# Patient Record
Sex: MALE | Race: WHITE
[De-identification: names, ages, dates, MRNs, and addresses within clinical notes are randomized per-mention and may not be internally consistent; named-entity substitution may affect disease eponyms.]

---

## 2023-08-14 ENCOUNTER — HOSPITAL ENCOUNTER (EMERGENCY)
Dept: HOSPITAL 76 - ED | Age: 23
Discharge: HOME | End: 2023-08-14
Payer: COMMERCIAL

## 2023-08-14 VITALS — SYSTOLIC BLOOD PRESSURE: 102 MMHG | DIASTOLIC BLOOD PRESSURE: 65 MMHG

## 2023-08-14 VITALS — OXYGEN SATURATION: 99 %

## 2023-08-14 DIAGNOSIS — R59.0: ICD-10-CM

## 2023-08-14 DIAGNOSIS — R31.9: ICD-10-CM

## 2023-08-14 DIAGNOSIS — N20.1: Primary | ICD-10-CM

## 2023-08-14 LAB
ALBUMIN DIAFP-MCNC: 4.8 G/DL (ref 3.2–5.5)
ALBUMIN/GLOB SERPL: 1.8 {RATIO} (ref 1–2.2)
ALP SERPL-CCNC: 68 IU/L (ref 42–121)
ALT SERPL W P-5'-P-CCNC: 21 IU/L (ref 10–60)
ANION GAP SERPL CALCULATED.4IONS-SCNC: 9 MMOL/L (ref 6–13)
AST SERPL W P-5'-P-CCNC: 23 IU/L (ref 10–42)
BASOPHILS NFR BLD AUTO: 0.1 10^3/UL (ref 0–0.1)
BASOPHILS NFR BLD AUTO: 1.6 %
BILIRUB BLD-MCNC: 1 MG/DL (ref 0.2–1)
BUN SERPL-MCNC: 22 MG/DL (ref 6–20)
CALCIUM UR-MCNC: 9.9 MG/DL (ref 8.5–10.3)
CHLORIDE SERPL-SCNC: 107 MMOL/L (ref 101–111)
CLARITY UR REFRACT.AUTO: (no result)
CO2 SERPL-SCNC: 25 MMOL/L (ref 21–32)
CREAT SERPLBLD-SCNC: 0.9 MG/DL (ref 0.6–1.3)
EOSINOPHIL # BLD AUTO: 0.1 10^3/UL (ref 0–0.7)
EOSINOPHIL NFR BLD AUTO: 2.5 %
ERYTHROCYTE [DISTWIDTH] IN BLOOD BY AUTOMATED COUNT: 12.8 % (ref 12–15)
GFRSERPLBLD MDRD-ARVRAT: 104 ML/MIN/{1.73_M2} (ref 89–?)
GLOBULIN SER-MCNC: 2.6 G/DL (ref 2.1–4.2)
GLUCOSE SERPL-MCNC: 126 MG/DL (ref 74–104)
GLUCOSE UR QL STRIP.AUTO: NEGATIVE MG/DL
HCT VFR BLD AUTO: 42.9 % (ref 42–52)
HGB UR QL STRIP: 14.5 G/DL (ref 14–18)
KETONES UR QL STRIP.AUTO: NEGATIVE MG/DL
LIPASE SERPL-CCNC: 31 U/L (ref 11–82)
LYMPHOCYTES # SPEC AUTO: 2.3 10^3/UL (ref 1.5–3.5)
LYMPHOCYTES NFR BLD AUTO: 40.6 %
MCH RBC QN AUTO: 29.4 PG (ref 27–31)
MCHC RBC AUTO-ENTMCNC: 33.8 G/DL (ref 32–36)
MCV RBC AUTO: 87 FL (ref 80–94)
MONOCYTES # BLD AUTO: 0.5 10^3/UL (ref 0–1)
MONOCYTES NFR BLD AUTO: 8.8 %
NEUTROPHILS # BLD AUTO: 2.6 10^3/UL (ref 1.5–6.6)
NEUTROPHILS # SNV AUTO: 5.6 X10^3/UL (ref 4.8–10.8)
NEUTROPHILS NFR BLD AUTO: 46.3 %
NITRITE UR QL STRIP.AUTO: NEGATIVE
NRBC # BLD AUTO: 0 /100WBC
NRBC # BLD AUTO: 0 X10^3/UL
PDW BLD AUTO: 10.2 FL (ref 7.4–11.4)
PH UR STRIP.AUTO: 7 PH (ref 5–7.5)
PLATELET # BLD: 254 10^3/UL (ref 130–450)
POTASSIUM SERPL-SCNC: 3.4 MMOL/L (ref 3.5–4.5)
PROT SPEC-MCNC: 7.4 G/DL (ref 6.4–8.9)
PROT UR STRIP.AUTO-MCNC: 30 MG/DL
RBC # UR STRIP.AUTO: (no result) /UL
RBC # URNS HPF: (no result) /HPF (ref 0–5)
RBC MAR: 4.93 10^6/UL (ref 4.7–6.1)
SODIUM SERPLBLD-SCNC: 141 MMOL/L (ref 135–145)
SP GR UR STRIP.AUTO: 1.02 (ref 1–1.03)
SQUAMOUS URNS QL MICRO: (no result)
UROBILINOGEN UR QL STRIP.AUTO: (no result) E.U./DL
UROBILINOGEN UR STRIP.AUTO-MCNC: NEGATIVE MG/DL
WBC # UR MANUAL: (no result) /HPF (ref 0–3)

## 2023-08-14 PROCEDURE — 76705 ECHO EXAM OF ABDOMEN: CPT

## 2023-08-14 PROCEDURE — 83690 ASSAY OF LIPASE: CPT

## 2023-08-14 PROCEDURE — 87086 URINE CULTURE/COLONY COUNT: CPT

## 2023-08-14 PROCEDURE — 81003 URINALYSIS AUTO W/O SCOPE: CPT

## 2023-08-14 PROCEDURE — 99284 EMERGENCY DEPT VISIT MOD MDM: CPT

## 2023-08-14 PROCEDURE — 96374 THER/PROPH/DIAG INJ IV PUSH: CPT

## 2023-08-14 PROCEDURE — 85025 COMPLETE CBC W/AUTO DIFF WBC: CPT

## 2023-08-14 PROCEDURE — 80053 COMPREHEN METABOLIC PANEL: CPT

## 2023-08-14 PROCEDURE — 81001 URINALYSIS AUTO W/SCOPE: CPT

## 2023-08-14 PROCEDURE — 96375 TX/PRO/DX INJ NEW DRUG ADDON: CPT

## 2023-08-14 PROCEDURE — 36415 COLL VENOUS BLD VENIPUNCTURE: CPT

## 2023-08-14 NOTE — ULTRASOUND REPORT
PROCEDURE: Abdomen Limited

 

INDICATIONS:  RUQ abd/flank pain thia AM

 

TECHNIQUE:  

Real-time focused scanning was performed of the abdomen, with image documentation.  

 

COMPARISONS: None.

 

FINDINGS:

Liver:  Liver is normal in size and homogeneous in echotexture. 

 

Gallbladder: Unremarkable.

 

Biliary ducts:  Intrahepatic bile ducts are non-dilated.  Extrahepatic bile duct caliber measures 4 m
m.  Normal is 6-7 mm or less in diameter, or 10 mm or less post-cholecystectomy.  

 

Pancreas:  Visualized portions of the pancreas are sonographically normal.  

 

Right kidney: Normal in size and echotexture.  Right kidney measures 10.3 cm long. No hydronephrosis 
or nephrolithiasis.  No solid masses. No complex renal cystic lesions which require follow-up.

 

Aorta:  Visualized aorta is normal in caliber at less than 3 cm.  

 

IVC:  Intrahepatic inferior vena cava is patent.  

 

Miscellaneous:  No free abdominal fluid.

 

 

IMPRESSION:  

No cholelithiasis or evidence of acute cholecystitis.

 

 

Reviewed by: Clemente Escobar MD on 8/14/2023 12:33 PM PDT

Approved by: Clemente Escobar MD on 8/14/2023 12:33 PM PDT

 

 

Station ID:  [id number]

## 2023-08-14 NOTE — ED PHYSICIAN DOCUMENTATION
PD HPI NVD





- Stated complaint


Stated Complaint: VOMIT,PX,NAUSEA





- Chief complaint


Chief Complaint: Abd Pain





- History obtained from


History obtained from: Patient





- History of Present Illness


Timing - onset: How many hours ago (1), Today


Timing - duration: Hours (1)


Timing - details: Abrupt onset, Still present


Pain level max: 10


Pain level now: 10


Associated symptoms: Abdominal pain, Loss of appetite.  No: Fever, Near syncope 

/ syncope


Contributing factors: Other (denies cannibis regular use.).  No: Sick contact, 

Bad food, Alcohol use


Improved by: No: Vomiting


Worsened by: No: Moving (pain is severe and not affectd by movement, 

breathing.), Palpation


Similar symptoms before: Has not had sx before


Recently seen: Not recently seen





Review of Systems


Constitutional: denies: Fever, Chills


Nose: denies: Rhinorrhea / runny nose, Congestion


Throat: denies: Sore throat


Respiratory: denies: Cough


GI: reports: Abdominal Pain, Nausea, Vomiting.  denies: Constipation, Diarrhea


: denies: Dysuria, Frequency


Skin: denies: Rash


Neurologic: denies: Near syncope, Syncope





PD PAST MEDICAL HISTORY





- Past Medical History


Cardiovascular: None


Respiratory: None


Neuro: None


Endocrine/Autoimmune: None





- Present Medications


Home Medications: 


                                Ambulatory Orders











 Medication  Instructions  Recorded  Confirmed


 


HYDROcod/ACETAM 5/325 [Norco 5/325] 1 ea PO Q6H PRN #12 tablet 08/14/23 


 


Naproxen 250 mg PO TID 5 Days #15 tablet 08/14/23 


 


Ondansetron Odt [Zofran] 4 mg TL Q6H PRN #10 tablet 08/14/23 














- Allergies


Allergies/Adverse Reactions: 


                                    Allergies











Allergy/AdvReac Type Severity Reaction Status Date / Time


 


No Known Drug Allergies Allergy   Verified 08/14/23 10:27














- Living Situation


Living Situation: reports: With spouse/s.o.


Living Arrangement: reports: At home (they are visiting [facility] from [place] 

and returning in few days. )





- Social History


Does the pt smoke?: No


Does the pt have substance abuse?: No





PD ED PE NORMAL





- Vitals


Vital signs reviewed: Yes





- General


General: Alert and oriented X 3, Well developed/nourished, Other (appears in 

severe pain, with sweating, nausea and vomiting. )





- Neck


Neck: Supple, no meningeal sign, No adenopathy





- Cardiac


Cardiac: RRR, No murmur





- Respiratory


Respiratory: No respiratory distress, Clear bilaterally





- Abdomen


Abdomen: Normal bowel sounds, Soft, Non distended, No organomegaly, Other 

(tender right mid abd and lateral abd. Moderate right CVA tenderness to 

percussion. Left abd not tender. )





- Derm


Derm: Warm and dry, No rash.  No: Normal color (somewhat pale coloring. )





- Extremities


Extremities: Normal ROM s pain, No edema, No calf tenderness / cord





- Neuro


Neuro: Alert and oriented X 3, No motor deficit, Normal speech





Results





- Vitals


Vitals: 


                               Vital Signs - 24 hr











  08/14/23 08/14/23 08/14/23





  10:23 12:27 14:02


 


Temperature 36.4 C L  36.5 C


 


Heart Rate 65 55 L 58 L


 


Respiratory 20 15 15





Rate   


 


Blood Pressure 135/86 H 101/68 102/65


 


O2 Saturation 100 99 99








                                     Oxygen











O2 Source                      Room air

















- Labs


Labs: 


                                Laboratory Tests











  08/14/23 08/14/23 08/14/23





  10:37 10:37 13:14


 


WBC  5.6  


 


RBC  4.93  


 


Hgb  14.5  


 


Hct  42.9  


 


MCV  87.0  


 


MCH  29.4  


 


MCHC  33.8  


 


RDW  12.8  


 


Plt Count  254  


 


MPV  10.2  


 


Neut # (Auto)  2.6  


 


Lymph # (Auto)  2.3  


 


Mono # (Auto)  0.5  


 


Eos # (Auto)  0.1  


 


Baso # (Auto)  0.1  


 


Absolute Nucleated RBC  0.00  


 


Nucleated RBC %  0.0  


 


Sodium   141 


 


Potassium   3.4 L 


 


Chloride   107 


 


Carbon Dioxide   25 


 


Anion Gap   9.0 


 


BUN   22 H 


 


Creatinine   0.9 


 


Estimated GFR (MDRD)   104 


 


Glucose   126 H 


 


Calcium   9.9 


 


Total Bilirubin   1.0 


 


AST   23 


 


ALT   21 


 


Alkaline Phosphatase   68 


 


Total Protein   7.4 


 


Albumin   4.8 


 


Globulin   2.6 


 


Albumin/Globulin Ratio   1.8 


 


Lipase   31 


 


Urine Color    YELLOW


 


Urine Clarity    CLOUDY


 


Urine pH    7.0


 


Ur Specific Gravity    1.020


 


Urine Protein    30 H


 


Urine Glucose (UA)    NEGATIVE


 


Urine Ketones    NEGATIVE


 


Urine Occult Blood    MODERATE H


 


Urine Nitrite    NEGATIVE


 


Urine Bilirubin    NEGATIVE


 


Urine Urobilinogen    0.2 (NORMAL)


 


Ur Leukocyte Esterase    NEGATIVE


 


Urine RBC    TNTC H


 


Urine WBC    0-3


 


Ur Squamous Epith Cells    FEW Squamous


 


Amorphous Sediment    Moderate


 


Urine Bacteria    Moderate H


 


Ur Microscopic Review    INDICATED


 


Urine Culture Comments    NOT INDICATED














- Rads (name of study)


  ** abd right U/S


Relevant Findings:: Prelim report reviewed (evaluated abd with U/S showing no 

hydronephrosis, normal gallbladder and visualized portion of liver/pancreas. U/S

 tech said apparent visualization of appendix is normal. ), EMP independent 

interpretation of test





PD Medical Decision Making





- ED course


Complexity details: reviewed results, considered differential (abrupt severe 

right abd/flank pain, with nausea and vomiting repetitively. Had improved pain 

iwth IV fluids/Zofran/Toradol/Dilaudid, but still some nausea and medium pain. 

Given then dilaudid repeat dose and some inapsine for symptoms. This improve 

symptoms reasonably more.  ), d/w patient


Reviewed Lab Results: 





our CT scanner broken/stopped working this moring and are unable to get CT scan.

 Information obtained by U/S was able to exclude or show normal apendix, 

gallbladder, pancreas, and no hydronephrosis. UA with blood but no signs of 

infection. Labs showing normal lipase and lFTs. I feel there is adequate 

information to conclude likely ureterolithiasis without current hydronephrosis 

at time of US. 





I discussed withs with patient and his wife, and they are comfortable with the 

diasgnosis. He is much improved with the IV medications and states his pain is 

essentially gone at this time on recheck. So he nmay have already passed the 

stone to bladder. However, will want to give scripts for meds in case of return 

of pain/nausea. They are staying in [facility] for few more days. Pt to 

return to ER if worse pain/vomiting despite Rx meds. 





Departure





- Departure


Disposition: 01 Home, Self Care


Clinical Impression: 


 Right sided abdominal pain, Hematuria, Ureterolithiasis





Condition: Stable


Record reviewed to determine appropriate education?: Yes


Instructions:  ED Stone Renal W Colic


Prescriptions: 


Naproxen 250 mg PO TID 5 Days #15 tablet


HYDROcod/ACETAM 5/325 [Norco 5/325] 1 ea PO Q6H PRN #12 tablet


 PRN Reason: Pain


Ondansetron Odt [Zofran] 4 mg TL Q6H PRN #10 tablet


 PRN Reason: Nausea / Vomiting


Comments: 


Your pain onset/pattern/severity, along with blood in your urine and normal 

otherwise blood tests/ultrasound suggest  passing a kidney stone. No signs of 

swelling of the kidney so it does not look like it is causing outflow 

obstruction in the ureter.





It may be that the stone already passed given your improvement in pain at this 

point.  However it may still be in the ureter and just not moving at the moment 

and not causing urine blockage.





See how your pain does through the day and tomorrow.  If you do not have any 

return of pain, then its may be that the stone already passed.  If you have some

 on and off pains then use Tylenol if needed for mild pains in I prescribed 

hydrocodone pain medicine to use for worse pain if needed.





As prescribed ondansetron/Zofran if needed for nausea.





I would suggest some anti-inflammatory such as ibuprofen or naproxen 2-3 times 

daily for the next few days as even if the stone passes, there is often some 

inflammation left over with some mild aching.





Adequate hydration.  You do not need to over hydrate.





Return check/return to the ER if intractable pain despite the prescriptions, 

fevers, repetitive vomiting, other concerns.





I sent your prescriptions to the [facility] pharmacy in [place].





I am prescribing a short course of narcotic pain medication for you.  These are 

potentially dangerous and addictive medications that should be used carefully.


These medications may constipate you.  Take an over-the-counter stool softener 

such as docusate twice daily with plenty of water while taking these 

medications.  If you go 24 hours without a bowel movement, take over-the-counter

 MiraLAX, per package instructions.


Do not drink or drive while taking these medications.


If you received narcotic or sedating medications while in the emergency 

department do not drive for 24 hours.  Store this medication in a safe, secure 

place and out of reach of children.


It is a violation of federal law to give or sell this medication to another 

person or to use in a manner other than prescribed.


The ED will not refill narcotic prescriptions, including prescriptions lost or 

stolen.  You can dispose of unwanted medications at the [facility]'s office 

or at several pharmacies such as [facility].


Forms:  PCP List


Discharge Date/Time: 08/14/23 14:02